# Patient Record
Sex: FEMALE | Race: WHITE
[De-identification: names, ages, dates, MRNs, and addresses within clinical notes are randomized per-mention and may not be internally consistent; named-entity substitution may affect disease eponyms.]

---
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
## 2023-09-14 NOTE — ED PHYSICIAN DOCUMENTATION
PD HPI ABD PAIN





- Stated complaint


Stated Complaint: ABD PX/VOMITING





- Chief complaint


Chief Complaint: Back Pain





- History obtained from


History obtained from: Patient, Family





- History of Present Illness


Timing - onset: Other (18-year-old with chronic abdominal pain and issues but 

today developed suddenly left flank pain which is completely new for her 

associated with vomiting.  She has never had a kidney stone.)





Review of Systems


Constitutional: denies: Fever, Chills


GI: reports: Nausea, Vomiting.  denies: Abdominal Pain, Diarrhea


: denies: Dysuria, Frequency





PD PAST MEDICAL HISTORY





- Past Medical History


Past Medical History: Yes


Respiratory: Asthma


GI: Chronic constipation


GYN: Ovarian cysts





- Past Surgical History


Past Surgical History: Yes


General: Appendectomy


Derm: Skin cancer surgery





- Present Medications


Home Medications: 


 Ambulatory Orders











 Medication  Instructions  Recorded  Confirmed


 


Dicyclomine [Bentyl] 10 mg ORAL DAILY 10/27/17 01/15/18


 


HYDROcod/ACETAM 5/325 [Norco 5/325] 1 - 2 ea PO Q6H PRN #15 tablet 01/15/18 


 


Ibuprofen [Motrin] 800 mg PO Q8H PRN #30 tablet 01/15/18 


 


Ondansetron HCl [Zofran] 4 mg PO Q6H PRN #10 tablet 01/15/18 














- Allergies


Allergies/Adverse Reactions: 


 Allergies











Allergy/AdvReac Type Severity Reaction Status Date / Time


 


No Known Drug Allergies Allergy   Verified 04/25/17 13:32














- Social History


Does the pt smoke?: No


Smoking Status: Never smoker


Does the pt drink ETOH?: No


Does the pt have substance abuse?: No





- Immunizations


Immunizations are current?: Yes





- POLST


Patient has POLST: No





PD ED PE NORMAL





- Vitals


Vital signs reviewed: Yes





- General


General: Alert and oriented X 3, No acute distress





- Abdomen


Abdomen: Normal bowel sounds, Soft, Non tender





- Back


Back: No CVA TTP, No spinal TTP





- Neuro


Neuro: Alert and oriented X 3, Normal speech





- Psych


Psych: Normal mood, Normal affect





Results





- Vitals


Vitals: 


 Vital Signs - 24 hr











  01/15/18





  14:29


 


Temperature 36.0 C L


 


Heart Rate 87


 


Respiratory 18





Rate 


 


Blood Pressure 127/90 H


 


O2 Saturation 99








 Oxygen











O2 Source                      Room air

















- Labs


Labs: 


 Laboratory Tests











  01/15/18 01/15/18 01/15/18





  14:53 16:10 16:10


 


WBC   12.0 H 


 


RBC   4.79 


 


Hgb   13.6 


 


Hct   40.9 


 


MCV   85.3 


 


MCH   28.4 


 


MCHC   33.3 


 


RDW   13.8 


 


Plt Count   326 


 


MPV   7.0 


 


Neut #   Not Reportable 


 


Lymph #   Not Reportable 


 


Mono #   Not Reportable 


 


Eos #   Not Reportable 


 


Baso #   Not Reportable 


 


Absolute Nucleated RBC   Not Reportable 


 


Total Counted   100 


 


Band Neuts % (Manual)   0 


 


Abnorm Lymph % (Manual)   0 


 


Nucleated RBC %   Not Reportable 


 


Neutrophils # (Manual)   10.7 H 


 


Lymphocytes # (Manual)   1.2 L 


 


Monocytes # (Manual)   0.1 


 


Eosinophils # (Manual)   0.0 


 


Basophils # (Manual)   0.0 


 


Manual Slide Review   Indicated 


 


WBC Morphology   NORMAL APPEARANCE 


 


Platelet Estimate   NORMAL (130-450,000) 


 


Platelet Morphology   NORMAL APPEARANCE 


 


RBC Morph Micro Appear   NORMAL APPEARANCE 


 


Sodium    136


 


Potassium    4.0


 


Chloride    104


 


Carbon Dioxide    23


 


Anion Gap    9.0


 


BUN    12


 


Creatinine    0.8


 


Glucose    84


 


Calcium    9.5


 


Total Bilirubin    0.5


 


AST    28


 


ALT    21


 


Alkaline Phosphatase    71


 


Total Protein    8.5 H


 


Albumin    4.7


 


Globulin    3.8


 


Albumin/Globulin Ratio    1.2


 


Lipase    21 L


 


Urine Color  YELLOW  


 


Urine Clarity  CLEAR  


 


Urine pH  6.0  


 


Ur Specific Gravity  >=1.030 H  


 


Urine Protein  NEGATIVE  


 


Urine Glucose (UA)  NEGATIVE  


 


Urine Ketones  NEGATIVE  


 


Urine Occult Blood  MODERATE H  


 


Urine Nitrite  NEGATIVE  


 


Urine Bilirubin  NEGATIVE  


 


Urine Urobilinogen  0.2 (NORMAL)  


 


Ur Leukocyte Esterase  NEGATIVE  


 


Urine RBC  11-25 H  


 


Urine WBC  4-5  


 


Ur Squamous Epith Cells  MOD Squamous H  


 


Urine Bacteria  Few  


 


Ur Microscopic Review  INDICATED  


 


Urine Culture Comments  NOT INDICATED  


 


Urine HCG, Qual  NEGATIVE  














- Rads (name of study)


  ** L retropetironeal sono


Radiology: Other (Mild left hydronephrosis, bilateral ureteral jets)





PD MEDICAL DECISION MAKING





- ED course


ED course: 





She has a history very consistent with renal colic, no obvious physical 

findings.  We discussed CT versus ultrasound and the parents opted for 

ultrasound which is reasonable.  And the findings there as well as the 

hematuria were also consistent with renal colic.  She was pain-free after IV 

fluids and Toradol here.





Departure





- Departure


Disposition: 01 Home, Self Care


Clinical Impression: 


 Renal colic on left side





Condition: Good


Record reviewed to determine appropriate education?: Yes


Instructions:  ED Stone Renal W Colic


Prescriptions: 


HYDROcod/ACETAM 5/325 [Norco 5/325] 1 - 2 ea PO Q6H PRN #15 tablet


 PRN Reason: Pain


Ibuprofen [Motrin] 800 mg PO Q8H PRN #30 tablet


 PRN Reason: PAIN &/OR FEVER


Ondansetron HCl [Zofran] 4 mg PO Q6H PRN #10 tablet


 PRN Reason: Nausea / Vomiting


Comments: 


Call your doctor to arrange a follow-up appointment, make the next available 

appointment.  In the interim, return anytime if worse or if new symptoms 

develop.





Your blood pressure was elevated today on check into the emergency department.  

This does not mean that you have hypertension, it is a common phenomenon to 

come to the emergency department and have elevated blood pressure.  I recommend 

that you see your primary care physician within the week to have it rechecked 

when you are feeling better. NONE